# Patient Record
Sex: FEMALE | Race: AMERICAN INDIAN OR ALASKA NATIVE
[De-identification: names, ages, dates, MRNs, and addresses within clinical notes are randomized per-mention and may not be internally consistent; named-entity substitution may affect disease eponyms.]

---

## 2017-07-22 ENCOUNTER — HOSPITAL ENCOUNTER (OUTPATIENT)
Dept: HOSPITAL 31 - C.ER | Age: 67
Setting detail: OBSERVATION
LOS: 2 days | Discharge: HOME | End: 2017-07-24
Attending: INTERNAL MEDICINE | Admitting: INTERNAL MEDICINE
Payer: MEDICARE

## 2017-07-22 VITALS — BODY MASS INDEX: 24.7 KG/M2

## 2017-07-22 DIAGNOSIS — R55: Primary | ICD-10-CM

## 2017-07-22 DIAGNOSIS — I16.0: ICD-10-CM

## 2017-07-22 DIAGNOSIS — Z87.891: ICD-10-CM

## 2017-07-22 DIAGNOSIS — I10: ICD-10-CM

## 2017-07-22 LAB
ALBUMIN SERPL-MCNC: 4.3 G/DL (ref 3.5–5)
ALBUMIN/GLOB SERPL: 1.1 {RATIO} (ref 1–2.1)
ALT SERPL-CCNC: 33 U/L (ref 9–52)
APTT BLD: 29 SECONDS (ref 21–34)
AST SERPL-CCNC: 28 U/L (ref 14–36)
BACTERIA #/AREA URNS HPF: (no result) /[HPF]
BASOPHILS # BLD AUTO: 0 K/UL (ref 0–0.2)
BASOPHILS NFR BLD: 0.8 % (ref 0–2)
BILIRUB UR-MCNC: NEGATIVE MG/DL
BUN SERPL-MCNC: 15 MG/DL (ref 7–17)
CALCIUM SERPL-MCNC: 9.1 MG/DL (ref 8.6–10.4)
EOSINOPHIL # BLD AUTO: 0.1 K/UL (ref 0–0.7)
EOSINOPHIL NFR BLD: 1.3 % (ref 0–4)
ERYTHROCYTE [DISTWIDTH] IN BLOOD BY AUTOMATED COUNT: 15.3 % (ref 11.5–14.5)
GFR NON-AFRICAN AMERICAN: > 60
GLUCOSE UR STRIP-MCNC: NORMAL MG/DL
HGB BLD-MCNC: 15.5 G/DL (ref 11–16)
INR PPP: 1
LEUKOCYTE ESTERASE UR-ACNC: (no result) LEU/UL
LYMPHOCYTES # BLD AUTO: 3.2 K/UL (ref 1–4.3)
LYMPHOCYTES NFR BLD AUTO: 58.4 % (ref 20–40)
MAGNESIUM SERPL-MCNC: 1.9 MG/DL (ref 1.6–2.3)
MCH RBC QN AUTO: 27.2 PG (ref 27–31)
MCHC RBC AUTO-ENTMCNC: 33.1 G/DL (ref 33–37)
MCV RBC AUTO: 82.1 FL (ref 81–99)
MONOCYTES # BLD: 0.7 K/UL (ref 0–0.8)
MONOCYTES NFR BLD: 12.6 % (ref 0–10)
NEUTROPHILS # BLD: 1.5 K/UL (ref 1.8–7)
NEUTROPHILS NFR BLD AUTO: 26.9 % (ref 50–75)
NRBC BLD AUTO-RTO: 0.1 % (ref 0–2)
PH UR STRIP: 6 [PH] (ref 5–8)
PLATELET # BLD: 250 K/UL (ref 130–400)
PMV BLD AUTO: 8.7 FL (ref 7.2–11.7)
PROT UR STRIP-MCNC: NEGATIVE MG/DL
PROTHROMBIN TIME: 11.3 SECONDS (ref 9.7–12.2)
RBC # BLD AUTO: 5.68 MIL/UL (ref 3.8–5.2)
RBC # UR STRIP: NEGATIVE /UL
SP GR UR STRIP: 1.01 (ref 1–1.03)
SQUAMOUS EPITHIAL: 3 /HPF (ref 0–5)
URINE NITRATE: NEGATIVE
UROBILINOGEN UR-MCNC: NORMAL MG/DL (ref 0.2–1)
WBC # BLD AUTO: 5.5 K/UL (ref 4.8–10.8)

## 2017-07-22 PROCEDURE — 81001 URINALYSIS AUTO W/SCOPE: CPT

## 2017-07-22 PROCEDURE — 99285 EMERGENCY DEPT VISIT HI MDM: CPT

## 2017-07-22 PROCEDURE — 83735 ASSAY OF MAGNESIUM: CPT

## 2017-07-22 PROCEDURE — 80053 COMPREHEN METABOLIC PANEL: CPT

## 2017-07-22 PROCEDURE — 97161 PT EVAL LOW COMPLEX 20 MIN: CPT

## 2017-07-22 PROCEDURE — 82948 REAGENT STRIP/BLOOD GLUCOSE: CPT

## 2017-07-22 PROCEDURE — 36415 COLL VENOUS BLD VENIPUNCTURE: CPT

## 2017-07-22 PROCEDURE — 85025 COMPLETE CBC W/AUTO DIFF WBC: CPT

## 2017-07-22 PROCEDURE — 97116 GAIT TRAINING THERAPY: CPT

## 2017-07-22 PROCEDURE — 85610 PROTHROMBIN TIME: CPT

## 2017-07-22 PROCEDURE — 85730 THROMBOPLASTIN TIME PARTIAL: CPT

## 2017-07-22 PROCEDURE — 84484 ASSAY OF TROPONIN QUANT: CPT

## 2017-07-22 PROCEDURE — 70450 CT HEAD/BRAIN W/O DYE: CPT

## 2017-07-22 NOTE — C.PDOC
History Of Present Illness


Pt had a near-syncopal episode associated with yesica-oral numbness. 


Time Seen by Provider: 17 18:21


Chief Complaint (Nursing): Dizziness/Lightheaded


History Per: Patient


Onset/Duration Of Symptoms: Hrs (tonight)


Current Symptoms Are (Timing): Better


Number Of Syncopal Episodes: 1


Associated Symptoms Preceding Syncopal Episode: Lightheadedness


Fall Associated With With Symptoms: No


Severity: Moderate


Additional History Per: Prior Records





- Symptoms Of CVA


Associated Symptoms: denies: Impaired Speech, Seizure Activity, New Vision 

Deficit(Left), New Vision Deficit(Right), New Confusion


Recent Head Trauma: No





Past Medical History


Reviewed: Historical Data, Nursing Documentation, Vital Signs


Vital Signs: 





 Last Vital Signs











Temp  98.2 F   17 18:12


 


Pulse  77   17 18:53


 


Resp  16   17 18:53


 


BP  133/78   17 18:53


 


Pulse Ox  98   17 18:53














- Medical History


PMH: HTN, Hypercholesterolemia





- [facility] Procedures











INSPECTION OF LEFT EAR, ENDO (16)








Family History: States: Hypertension





- Social History


Hx Tobacco Use: No


Hx Alcohol Use: No


Hx Substance Use: No





- Immunization History


Hx Tetanus Toxoid Vaccination: No


Hx Pneumococcal Vaccination: No





Review Of Systems


Except As Marked, All Systems Reviewed And Found Negative.


Constitutional: Positive for: Malaise.  Negative for: Fever


Cardiovascular: Positive for: Light Headedness.  Negative for: Chest Pain


Respiratory: Negative for: Shortness of Breath, Hemoptysis


Gastrointestinal: Negative for: Vomiting, Abdominal Pain, Diarrhea


Genitourinary: Negative for: Dysuria


Musculoskeletal: Negative for: Neck Pain, Back Pain


Skin: Negative for: Rash


Neurological: Positive for: Numbness (yesica-oral).  Negative for: Weakness, 

Incoordination, Change in Speech, Confusion, Seizures, Altered Mental Status, 

Headache





Physical Exam





- Physical Exam


Appears: Non-toxic, No Acute Distress


Skin: Normal Color, Warm, Dry, No Rash


Head: Atraumatic, Normacephalic


Eye(s): bilateral: Normal Inspection, PERRL, EOMI


Oral Mucosa: Moist, No Drooling, No Trismus


Neck: Normal ROM, Supple


Cardiovascular: Rhythm Regular


Respiratory: Normal Breath Sounds, No Accessory Muscle Use


Gastrointestinal/Abdominal: Soft, No Tenderness


Back: No CVA Tenderness


Extremity: Normal ROM


Neurological/Psych: Oriented x3, Normal Speech, Normal Cognition, Normal 

Cranial Nerves, No Cerebellar Signs, Normal Motor, Normal Sensation





ED Course And Treatment





- Laboratory Results


Result Diagrams: 


 17 18:57





 17 18:57


Lab Interpretation: No Acute Changes


ECG: Interpreted By Me, Viewed By Me


ECG Rhythm: Sinus Rhythm, Nonspecific Changes


Rate From EC


O2 Sat by Pulse Oximetry: 98


Pulse Ox Interpretation: Normal





- CT Scan/US


  ** CT head


Other Rad Studies (CT/US): Read By Radiologist, Radiology Report Reviewed


CT/US Interpretation: IMPRESSION:  1.  No definite acute intracranial 

abnormality.  Acute infarction may be CT.  occult within first 24 hours.  If a 

focal deficit persists, consider followup.  CT or MRI for further evaluation.  

2.  Incidental/non-acute findings are described above.





- Physician Consult Information


Physician Contacted: Dario Cisneros (PMD)


Outcome Of Conversation: He wants pt placed under Dr. DARIO Martin's service.





Progress





- Interventions


Interventions:: Observation, Intravenous fluid





- Data Reviewed


Data Reviewed: Lab, Diagnostic imaging, EKG, Old records





- Patient Status


Patient status: Mostly improved





- Continuity of Care


Discussed patient case with:: Patient, Family-HIPPA compliant, ED Nurse, PMD, 

Covering for PMD





Disposition


Discussed With : Jaylen Martin


Comment: He accepted pt on his service.


Doctor Will See Patient In The: Hospital


Counseled Patient/Family Regarding: Studies Performed, Diagnosis





- Disposition


Disposition: HOSPITALIZED


Disposition Time: 22:25


Condition: STABLE





- Clinical Impression


Clinical Impression: 


 Near syncope, Perioral numbness

## 2017-07-22 NOTE — CT
EXAM:

  CT Head Without Intravenous Contrast



CLINICAL HISTORY:

  66 years old, female; Signs and symptoms; Syncope and collapse; Additional 

info: Near syncope, yesica-oral numbness



TECHNIQUE:

  Axial computed tomography images of the head/brain without intravenous 

contrast.  This CT exam was performed using one or more of the following dose 

reduction techniques:  automated exposure control, adjustment of the mA and/or 

kV according to patient size, and/or use of iterative reconstruction technique.



COMPARISON:

  No relevant prior studies available.



FINDINGS:

  Brain:  Mild atrophy.  Minimal encephalomalacia within LEFT frontal region.  

No intracranial hemorrhage.  No mass.  No definite edema.

  Ventricles:  No hydrocephalus.

  Bones/joints:  No acute fracture.

  Soft tissues:  Unremarkable.

  Sinuses:  Scattered minimal mucosal thickening of ethmoid sinuses.

  Mastoid air cells:  No mastoid effusion.

  Orbits:  Unremarkable as visualized.



IMPRESSION:     

1.  No definite acute intracranial abnormality.  Acute infarction may be CT 

occult within first 24 hours.  If a focal deficit persists, consider followup 

CT or MRI for further evaluation.

2.  Incidental/non-acute findings are described above.

## 2017-07-22 NOTE — CP.PCM.HP
Past Patient History





- Tetanus Immunizations


Tetanus Immunization: Unknown





- Past Medical History & Family History


Past Medical History?: Yes





- Past Social History


Smoking Status: Heavy Smoker > 10 Cigarettes Daily





- CARDIAC


Hx Hypercholesterolemia: Yes


Hx Hypertension: Yes





- NEUROLOGICAL


HX Cerebrovascular Accident: Yes (99, 02)





- MUSCULOSKELETAL/RHEUMATOLOGICAL


Hx Falls: No





- PSYCHIATRIC


Hx Substance Use: No





- ANESTHESIA


Hx Anesthesia: Yes


Hx Anesthesia Reactions: No


Hx Malignant Hyperthermia: No





Meds


Allergies/Adverse Reactions: 


 Allergies











Allergy/AdvReac Type Severity Reaction Status Date / Time


 


No Known Allergies Allergy   Verified 07/22/17 18:18














Results





- Vital Signs


Recent Vital Signs: 





 Last Vital Signs











Temp  98.2 F   07/22/17 18:12


 


Pulse  80   07/22/17 22:56


 


Resp  14   07/22/17 22:56


 


BP  124/71   07/22/17 22:56


 


Pulse Ox  98   07/22/17 22:56














- Labs


Result Diagrams: 


 07/22/17 18:57





 07/22/17 18:57

## 2017-07-23 LAB
CK MB SERPL-MCNC: 0.92 NG/ML (ref 0–3.38)
CK MB SERPL-MCNC: 1.01 NG/ML (ref 0–3.38)

## 2017-07-23 RX ADMIN — ENOXAPARIN SODIUM SCH MG: 40 INJECTION SUBCUTANEOUS at 14:45

## 2017-07-23 NOTE — CP.PCM.PN
Subjective





- Date & Time of Evaluation


Date of Evaluation: 07/23/17


Time of Evaluation: 12:40





- Subjective


Subjective: 








clinically same





Objective





- Vital Signs/Intake and Output


Vital Signs (last 24 hours): 


 











Temp Pulse Resp BP Pulse Ox


 


 98.2 F   80   20   120/80   98 


 


 07/22/17 18:12  07/23/17 09:35  07/22/17 23:55  07/23/17 09:35  07/22/17 22:56











- Medications


Medications: 


 Current Medications





Aspirin (Ecotrin)  81 mg PO DAILY [facility]


   Last Admin: 07/23/17 09:32 Dose:  81 mg


Losartan Potassium (Cozaar)  50 mg PO DAILY [facility]


   Last Admin: 07/23/17 09:34 Dose:  50 mg


Pneumococcal Polyvalent Vaccine (Pneumovax 23 Vaccine)  0.5 ml IM .ONCE ONE


   Stop: 07/25/17 14:01


Rosuvastatin Calcium (Crestor)  10 mg PO HS [facility]











- Labs


Labs: 


 











PT  11.3 SECONDS (9.7-12.2)   07/22/17  18:57    


 


INR  1.0   07/22/17  18:57    


 


APTT  29 SECONDS (21-34)   07/22/17  18:57    














- Constitutional


Appears: Well





- Head Exam


Head Exam: ATRAUMATIC, NORMAL INSPECTION, NORMOCEPHALIC





- Eye Exam


Eye Exam: EOMI, Normal appearance, PERRL


Pupil Exam: NORMAL ACCOMODATION, PERRL





- ENT Exam


ENT Exam: Mucous Membranes Moist, Normal Exam





- Neck Exam


Neck Exam: Full ROM, Normal Inspection.  absent: Lymphadenopathy





- Respiratory Exam


Respiratory Exam: Decreased Breath Sounds





- Cardiovascular Exam


Cardiovascular Exam: REGULAR RHYTHM, +S1, +S2.  absent: Murmur





- GI/Abdominal Exam


GI & Abdominal Exam: Soft, Diminished Bowel Sounds





- Rectal Exam


Rectal Exam: Deferred

## 2017-07-23 NOTE — CON
DATE:  07/23/2017



CHIEF COMPLAINT:  Near syncope.



HISTORY OF PRESENT ILLNESS:  This is a 66-year-old woman with history of

hypertension, uncontrolled hypercholesterolemia, history of hypertensive

urgency in the past.  She became lightheaded with perioral numbness, rather

spinning sensation of the room, but no focal weakness or paresthesias of

the extremities.  She had elevated systolic blood pressure over 165 which

is high and slightly elevated diastolic pressure.  Currently, her blood

pressure is much better.  She is doing much better at this time.  No focal

neurological deficits.  On examination, CAT scan of the head showed no

acute intracranial abnormality.



PAST MEDICAL HISTORY:  Hypertension, hypercholesterolemia.



ALLERGIES:  No known drug allergies.



FAMILY HISTORY:  Noncontributory.



REVIEW OF SYSTEMS:  A 14-point review of systems negative except per HPI.



MEDICATIONS:  Reviewed by nurse reconciliation sheet.



PHYSICAL EXAMINATION

VITAL SIGNS:  Temperature 98, pulse rate of 80, blood pressure 120/80,

respiratory rate *------*.

GENERAL:  The patient is sitting up in bed, in no acute distress.

HEENT:  Atraumatic, normocephalic.  PERRLA.  Extraocular muscles intact.

NECK:  Supple.  No JVD.  No adenopathy noted.

LUNGS:  Clear to auscultation.  No adventitious sounds.

HEART:  S1 and S2.  Normal rate and rhythm.  No murmur, rubs, or gallops.

ABDOMEN:  Soft, nontender, nondistended.  Bowel sounds are present.

EXTREMITIES:  No clubbing.  No cyanosis.  Peripheral pulses 2+ felt

bilaterally.

NEUROLOGIC:  The patient is alert, oriented to person, place, month and

year.  Speech is fluent without any errors.  Cranial nerves II through XII

intact.  Motor exam:  Moves all extremities equally.  Toes downgoing

bilaterally.  Sensory exam:  Light touch, proprioception, vibration intact.

DTRs are 2+.  Coordination, finger-to-nose intact.  Gait deferred for now.



LABORATORY DATA:  Sodium is 141, potassium 3.9, chloride 100, carbon

dioxide 26, BUN of 15, creatinine 0.7, random glucose 111.



ASSESSMENT:

1.  A 66-year-old woman history of hypertension, hypercholesterolemia, came

in for lightheadedness with perioral numbness.  Her symptoms are likely

secondary to mild transient hypertensive urgency.  At this time, she is

clinically stable.  Neuro exam is nonfocal.  CAT scan of the head showed no

acute intracranial abnormalities.  At this time, recommend low-sodium,

low-fat diet.

2.  Keep the systolic blood pressure between 130 to 140.

3.  Aspirin 81 and atorvastatin 20 for stroke prevention and continue

present medical management.  She is cleared from a neurological

perspective.







__________________________________________

Fransisco Nicholson MD



DD:  07/23/2017 14:28:51

DT:  07/23/2017 16:25:24

Job # [id number]

## 2017-07-24 VITALS
TEMPERATURE: 97.6 F | HEART RATE: 73 BPM | DIASTOLIC BLOOD PRESSURE: 72 MMHG | RESPIRATION RATE: 18 BRPM | SYSTOLIC BLOOD PRESSURE: 114 MMHG | OXYGEN SATURATION: 96 %

## 2017-07-24 RX ADMIN — ENOXAPARIN SODIUM SCH MG: 40 INJECTION SUBCUTANEOUS at 10:37

## 2017-07-24 NOTE — CP.PCM.CON
History of Present Illness





- History of Present Illness


History of Present Illness: 





66-year-old lady with history of hypertension and mixed hyperlipidemia, 

reportedly with prior cerebrovascular accident twice however she has no 

residual deficit. She also had history of thyroid malignancy post surgery and 

radiation. She was admitted through the emergency department with feeling weak 

and dizzy and addition to numbness around her mouth. There was no loss of 

consciousness and no reported hypotension or arrhythmia in the emergency 

department or on EKG. The CAT scan of the head was negative for cerebrovascular 

accident or bleed. Recent echocardiogram at [facility] few months back was 

with normal left ventricular contractility no congenital abnormalities, no 

aortic stenosis and no pericardial disease. At this time she is Rather 

comfortable after observation with stable blood pressure after presentation 

with elevated blood pressure, labs had no anemia no evidence of bleed, and 

troponin I was flat for myocardial injury. Unlikely arrhythmia or cardiac 

etiology for the dizziness and near syncope. Further stress test can be done as 

outpatient.





Review of Systems





- Constitutional


Constitutional: Anorexia, Weakness





- EENT


Eyes: absent: Discharge


Ears: absent: Ear Discharge, Dizziness


Nose/Mouth/Throat: absent: Epistaxis





- Cardiovascular


Cardiovascular: absent: Acrocyanosis, Chest Pain, Diaphoresis, Palpitations, 

Pedal Edema, Syncope





- Respiratory


Respiratory: absent: Cough, Dyspnea, Hemoptysis





- Gastrointestinal


Gastrointestinal: absent: Diarrhea, Hematochezia, Vomiting





- Genitourinary


Genitourinary: absent: Hematuria





- Reproductive: Female


Reproductive:Female: Post Menopausal





Past Patient History





- Tetanus Immunizations


Tetanus Immunization: Unknown





- Past Medical History & Family History


Past Medical History?: Yes





- Past Social History


Smoking Status: Former Smoker





- CARDIAC


Hx Cardiac Disorders: Yes


Hx Hypercholesterolemia: Yes


Hx Hypertension: Yes





- PULMONARY


Hx Respiratory Disorders: No





- NEUROLOGICAL


Hx Neurological Disorder: Yes


HX Cerebrovascular Accident: Yes (99, 02)





- HEENT


Hx HEENT Problems: No





- RENAL


Hx Chronic Kidney Disease: No





- ENDOCRINE/METABOLIC


Hx Endocrine Disorders: No





- HEMATOLOGICAL/ONCOLOGICAL


Hx Blood Disorders: No





- INTEGUMENTARY


Hx Dermatological Problems: No





- MUSCULOSKELETAL/RHEUMATOLOGICAL


Hx Musculoskeletal Disorders: No


Hx Falls: No





- GASTROINTESTINAL


Hx Gastrointestinal Disorders: No





- GENITOURINARY/GYNECOLOGICAL


Hx Genitourinary Disorders: No


Hx Uterine Cancer: Yes (Had radiation,in remission)





- PSYCHIATRIC


Hx Psychophysiologic Disorder: No


Hx Substance Use: No





- SURGICAL HISTORY


Hx Surgeries: Yes


Hx Hysterectomy: Yes (Partial 1999)





- ANESTHESIA


Hx Anesthesia: Yes


Hx Anesthesia Reactions: No


Hx Malignant Hyperthermia: No


Has any member of the family had a problem w/ anesthesia?: No





Meds


Home Medications: 


 Home Medication List











 Medication  Instructions  Recorded  Confirmed  Type


 


Losartan [Cozaar] 50 mg PO DAILY  tab 07/24/17  Rx


 


Losartan [Cozaar] 50 mg PO DAILY #30 tab 07/24/17  Rx











Allergies/Adverse Reactions: 


 Allergies











Allergy/AdvReac Type Severity Reaction Status Date / Time


 


No Known Allergies Allergy   Verified 07/22/17 18:18














- Medications


Medications: 


 Current Medications





Aspirin (Ecotrin)  81 mg PO DAILY [facility]


   Last Admin: 07/24/17 10:37 Dose:  81 mg


Enoxaparin Sodium (Lovenox)  40 mg SC DAILY [facility]


   Last Admin: 07/24/17 10:37 Dose:  40 mg


Losartan Potassium (Cozaar)  50 mg PO DAILY [facility]


   Last Admin: 07/24/17 10:37 Dose:  50 mg


Pneumococcal Polyvalent Vaccine (Pneumovax 23 Vaccine)  0.5 ml IM .ONCE ONE


   Stop: 07/25/17 14:01


Rosuvastatin Calcium (Crestor)  10 mg PO [facility]


   Last Admin: 07/23/17 21:38 Dose:  10 mg











Physical Exam





- Constitutional


Appears: Non-toxic





- Head Exam


Head Exam: ATRAUMATIC





- Eye Exam


Eye Exam: EOMI





- ENT Exam


ENT Exam: Mucous Membranes Moist





- Neck Exam


Neck exam: Negative for: Lymphadenopathy, Thyromegaly





- Respiratory Exam


Respiratory Exam: Clear to Auscultation Bilateral.  absent: Chest Wall 

Tenderness, Rales





- Cardiovascular Exam


Cardiovascular Exam: REGULAR RHYTHM, Systolic Murmur





- GI/Abdominal Exam


GI & Abdominal Exam: Normal Bowel Sounds.  absent: Organomegaly





- Rectal Exam


Rectal Exam: Deferred





- Extremities Exam


Extremities exam: Positive for: normal capillary refill.  Negative for: calf 

tenderness





- Neurological Exam


Neurological exam: Alert, Oriented x3





- Psychiatric Exam


Psychiatric exam: Normal Mood





- Skin


Skin Exam: Dry





Results





- Vital Signs


Recent Vital Signs: 


 Last Vital Signs











Temp  97.6 F   07/24/17 07:25


 


Pulse  73   07/24/17 07:25


 


Resp  18   07/24/17 07:25


 


BP  114/72   07/24/17 07:25


 


Pulse Ox  96   07/24/17 07:25














- Labs


Result Diagrams: 


 07/22/17 18:57





 07/22/17 18:57


Labs: 


 Laboratory Results - last 24 hr











  07/23/17





  13:54


 


Total Creatine Kinase  146 H


 


CK-MB (Mass)  0.92


 


Troponin I, Quant  < 0.0120














Assessment & Plan


(1) Near syncope


Status: Acute   


Comment: Unlikely cardiac arrhythmia, normal left ventricular contractility on 

echo, no aortic stenosis.   





(2) History of CVA (cerebrovascular accident)


Status: Chronic   





(3) Hyperlipemia


Status: Chronic   





(4) History of hypertension


Status: Chronic   Priority: Low   





(5) History of thyroid cancer


Status: Chronic   Priority: Low

## 2017-07-24 NOTE — CP.PCM.PN
Subjective





- Date & Time of Evaluation


Date of Evaluation: 07/24/17


Time of Evaluation: 14:26





- Subjective


Subjective: 





65 Y/O FEMALE SEEN AND EXAMINED BY DR DARIO BANUELOS, PT DENIES ANY CHEST PAIN, 

SHORTNESS OF BREATH, PALPITATIONS, RESP EASY AND UNLABORED. NAD





Objective





- Vital Signs/Intake and Output


Vital Signs (last 24 hours): 


 











Temp Pulse Resp BP Pulse Ox


 


 97.6 F   73   18   114/72   96 


 


 07/24/17 07:25  07/24/17 07:25  07/24/17 07:25  07/24/17 07:25  07/24/17 07:25











- Medications


Medications: 


 Current Medications





Aspirin (Ecotrin)  81 mg PO DAILY [facility]


   Last Admin: 07/24/17 10:37 Dose:  81 mg


Enoxaparin Sodium (Lovenox)  40 mg SC DAILY [facility]


   Last Admin: 07/24/17 10:37 Dose:  40 mg


Losartan Potassium (Cozaar)  50 mg PO DAILY [facility]


   Last Admin: 07/24/17 10:37 Dose:  50 mg


Pneumococcal Polyvalent Vaccine (Pneumovax 23 Vaccine)  0.5 ml IM .ONCE ONE


   Stop: 07/25/17 14:01


Rosuvastatin Calcium (Crestor)  10 mg PO HS [facility]


   Last Admin: 07/23/17 21:38 Dose:  10 mg











- Labs


Labs: 


 











PT  11.3 SECONDS (9.7-12.2)   07/22/17  18:57    


 


INR  1.0   07/22/17  18:57    


 


APTT  29 SECONDS (21-34)   07/22/17  18:57    














Assessment and Plan





- Assessment and Plan (Free Text)


Assessment: 





65 Y/O FEMALE WITH PMHX HTN, HYPERCHOLESTEROLEMIA, ADMITTED FOR NEAR SYNCOPE, 

PERIORAL NUMBNESS. CT HEAD- NO ACUTE INTRACRANIAL ABNORMALITIES, ECHO- NORMAL 

EF. PT CLEARED FOR D/C AS PER DR RAMOS AND DR QUINTANA, PT EDUCATED TO CONTINUE 

HOME MEDS, F/U WITH DR BANUELOS, DR QUINTANA, DR RAMOS IN THE OFFICE, OUTPT STRESS 

TEST, RETURN TO ED OF ANY WORSENING SYMPTOMS AGREE W/POC, VERBALIZE 

UNDERSTANDING.

## 2017-07-25 NOTE — CARD
--------------- APPROVED REPORT --------------





EKG Measurement

Heart [id number]

[place] [id number]

[id number]

[id number]

[id number]



<Conclusion>

Normal sinus rhythm

Poor R wave progression.

May be due to electrodes placement.

## 2018-01-10 ENCOUNTER — HOSPITAL ENCOUNTER (EMERGENCY)
Dept: HOSPITAL 31 - C.ER | Age: 68
Discharge: HOME | End: 2018-01-10
Payer: MEDICARE

## 2018-01-10 VITALS — TEMPERATURE: 98.2 F | SYSTOLIC BLOOD PRESSURE: 132 MMHG | DIASTOLIC BLOOD PRESSURE: 80 MMHG | HEART RATE: 84 BPM

## 2018-01-10 VITALS — OXYGEN SATURATION: 100 % | RESPIRATION RATE: 18 BRPM

## 2018-01-10 VITALS — BODY MASS INDEX: 24.7 KG/M2

## 2018-01-10 DIAGNOSIS — R07.89: Primary | ICD-10-CM

## 2018-01-10 DIAGNOSIS — E78.00: ICD-10-CM

## 2018-01-10 DIAGNOSIS — N39.0: ICD-10-CM

## 2018-01-10 DIAGNOSIS — R05: ICD-10-CM

## 2018-01-10 DIAGNOSIS — F17.210: ICD-10-CM

## 2018-01-10 DIAGNOSIS — I10: ICD-10-CM

## 2018-01-10 LAB
ALBUMIN SERPL-MCNC: 4.1 G/DL (ref 3.5–5)
ALBUMIN/GLOB SERPL: 1 {RATIO} (ref 1–2.1)
ALT SERPL-CCNC: 23 U/L (ref 9–52)
AST SERPL-CCNC: 33 U/L (ref 14–36)
BACTERIA #/AREA URNS HPF: (no result) /[HPF]
BASOPHILS # BLD AUTO: 0.1 K/UL (ref 0–0.2)
BASOPHILS NFR BLD: 0.9 % (ref 0–2)
BILIRUB UR-MCNC: NEGATIVE MG/DL
BUN SERPL-MCNC: 12 MG/DL (ref 7–17)
CALCIUM SERPL-MCNC: 8.3 MG/DL (ref 8.6–10.4)
EOSINOPHIL # BLD AUTO: 0 K/UL (ref 0–0.7)
EOSINOPHIL NFR BLD: 0.8 % (ref 0–4)
ERYTHROCYTE [DISTWIDTH] IN BLOOD BY AUTOMATED COUNT: 15.2 % (ref 11.5–14.5)
GFR NON-AFRICAN AMERICAN: > 60
GLUCOSE UR STRIP-MCNC: NORMAL MG/DL
HGB BLD-MCNC: 15.7 G/DL (ref 11–16)
LEUKOCYTE ESTERASE UR-ACNC: (no result) LEU/UL
LYMPHOCYTES # BLD AUTO: 2.7 K/UL (ref 1–4.3)
LYMPHOCYTES NFR BLD AUTO: 45.2 % (ref 20–40)
MCH RBC QN AUTO: 26.6 PG (ref 27–31)
MCHC RBC AUTO-ENTMCNC: 32.6 G/DL (ref 33–37)
MCV RBC AUTO: 81.6 FL (ref 81–99)
MONOCYTES # BLD: 0.6 K/UL (ref 0–0.8)
MONOCYTES NFR BLD: 9.6 % (ref 0–10)
NEUTROPHILS # BLD: 2.6 K/UL (ref 1.8–7)
NEUTROPHILS NFR BLD AUTO: 43.5 % (ref 50–75)
NRBC BLD AUTO-RTO: 0.2 % (ref 0–2)
PH UR STRIP: 7 [PH] (ref 5–8)
PLATELET # BLD: 248 K/UL (ref 130–400)
PMV BLD AUTO: 7.9 FL (ref 7.2–11.7)
PROT UR STRIP-MCNC: NEGATIVE MG/DL
RBC # BLD AUTO: 5.91 MIL/UL (ref 3.8–5.2)
RBC # UR STRIP: NEGATIVE /UL
SP GR UR STRIP: 1.01 (ref 1–1.03)
SQUAMOUS EPITHIAL: 1 /HPF (ref 0–5)
URINE NITRATE: POSITIVE
UROBILINOGEN UR-MCNC: NORMAL MG/DL (ref 0.2–1)
WBC # BLD AUTO: 5.9 K/UL (ref 4.8–10.8)

## 2018-01-10 NOTE — RAD
HISTORY:

SOB  



COMPARISON:

Comparison chest dated 08/27/2016. PacsNo prior.



TECHNIQUE:

Chest PA and lateral



FINDINGS:



LUNGS:

The interstitial markings are increased and coarsened.  Rule out 

sequela of reactive/ inflammatory airway disease or viral illness. No 

focal consolidation. Minor biapical pleural thickening. 



PLEURA:

As above. No significant effusion.  No evidence of pneumothorax.



CARDIOVASCULAR:

Heart size is upper limits of normal.



OSSEOUS STRUCTURES:

No significant abnormalities.



VISUALIZED UPPER ABDOMEN:

Normal.



OTHER FINDINGS:

None.



IMPRESSION:

The interstitial markings are increased and coarsened.  Rule out 

sequela of reactive/ inflammatory airway disease or viral illness. No 

focal consolidation.

## 2018-01-10 NOTE — C.PDOC
History Of Present Illness


68 y/o female with PMHx of HTN presents to ED with complaints of left sided 

"sharp" chest pain since this morning and productive of light sputum cough for 

3 days. Patient reports the pain radiates to her back and states she took 

Tylenol and Aleve with improvement but symptoms recurred back which prompted 

visit to ED. Patient denies fever, chills, sob, trauma or any other complaints 

at this time. 


Time Seen by Provider: 01/10/18 16:26


Chief Complaint (Nursing): Chest Pain


History Per: Patient


History/Exam Limitations: no limitations


Onset/Duration Of Symptoms: Days


Current Symptoms Are (Timing): Still Present





Past Medical History


Reviewed: Historical Data, Nursing Documentation, Vital Signs


Vital Signs: 


 Last Vital Signs











Temp  98.2 F   01/10/18 18:05


 


Pulse  84   01/10/18 18:05


 


Resp  18   01/10/18 18:05


 


BP  132/80   01/10/18 18:05


 


Pulse Ox  100   01/10/18 18:05














- Medical History


PMH: HTN, Hypercholesterolemia


Surgical History: No Surg Hx





- [facility] Procedures








INSPECTION OF LEFT EAR, ENDO (08/29/16)








Family History: States: Hypertension





- Social History


Hx Tobacco Use: No


Hx Alcohol Use: No


Hx Substance Use: No





- Immunization History


Hx Tetanus Toxoid Vaccination: No


Hx Influenza Vaccination: No


Hx Pneumococcal Vaccination: No





Review Of Systems


Constitutional: Negative for: Fever, Chills


Cardiovascular: Positive for: Chest Pain


Respiratory: Positive for: Cough, Sputum.  Negative for: Shortness of Breath


Gastrointestinal: Negative for: Nausea, Vomiting


Skin: Negative for: Rash


Neurological: Negative for: Weakness, Numbness





Physical Exam





- Physical Exam


Appears: Non-toxic, No Acute Distress


Skin: Normal Color, Warm, Dry, No Rash


Head: Atraumatic, Normacephalic


Oral Mucosa: Moist


Neck: Normal ROM, Supple


Chest: Other (Reproducible chest pain to palpation)


Cardiovascular: Rhythm Regular


Respiratory: No Rales, No Rhonchi, Wheezing (Left sided)


Gastrointestinal/Abdominal: Soft, No Tenderness, No Guarding, No Rebound


Extremity: Normal ROM, No Pedal Edema, Capillary Refill (<2 seconds)


Neurological/Psych: Oriented x3





ED Course And Treatment





- Laboratory Results


Result Diagrams: 


 01/10/18 16:39





 01/10/18 16:39


Lab Interpretation: Normal


ECG: Interpreted By Me


ECG Rhythm: Sinus Rhythm (with Q waves in III, AVF)


ECG Interpretation: No Acute Changes


O2 Sat by Pulse Oximetry: 100 (RA)


Pulse Ox Interpretation: Normal





- Radiology


CXR: Viewed By Me, Read By Radiologist


CXR Interpretation: Yes: Other (Increased interstitial infiltrates)


Reevaluation Time: 19:10


Reassessment Condition: Improved (No discomfort at this time.)





Disposition


Counseled Patient/Family Regarding: Studies Performed, Diagnosis, Need For 

Followup, Rx Given





- Disposition


Referrals: 


Dario Cisneros MD [Staff Provider] - 


Disposition: HOME/ ROUTINE


Disposition Time: 19:11


Condition: STABLE


Prescriptions: 


Acetaminophen with Codeine [Tylenol with Codeine #3 Tablet] 1 each PO Q4 PRN #

14 tablet


 PRN Reason: Pain, Severe (8-10)


Amoxicillin/Clavulanate [Augmentin 875 MG-125 MG] 1 tab PO BID #14 tab


Instructions:  Chest Wall Pain (ED), Acute Cough (ED), Urinary Tract Infection 

in Women (ED)


Forms:  [facility] (English)





- Clinical Impression


Clinical Impression: 


 Chest wall pain, Cough, UTI (urinary tract infection)








- Scribe Statement


The provider has reviewed the documentation as recorded by the Scribcristy Chi





All medical record entries made by the Scribe were at my direction and 

personally dictated by me. I have reviewed the chart and agree that the record 

accurately reflects my personal performance of the history, physical exam, 

medical decision making, and the department course for this patient. I have 

also personally directed, reviewed, and agree with the discharge instructions 

and disposition.

## 2018-01-11 NOTE — CARD
--------------- APPROVED REPORT --------------





EKG Measurement

Heart [id number]

[place] [id number]

[id number]

[id number]

[id number]



<Conclusion>

Normal sinus rhythm

Inferior infarct, age undetermined

Abnormal ECG

## 2019-02-26 ENCOUNTER — HOSPITAL ENCOUNTER (EMERGENCY)
Dept: HOSPITAL 31 - C.ER | Age: 69
Discharge: HOME | End: 2019-02-26
Payer: MEDICARE

## 2019-02-26 VITALS
OXYGEN SATURATION: 97 % | RESPIRATION RATE: 16 BRPM | DIASTOLIC BLOOD PRESSURE: 85 MMHG | TEMPERATURE: 98.4 F | SYSTOLIC BLOOD PRESSURE: 130 MMHG | HEART RATE: 83 BPM

## 2019-02-26 VITALS — BODY MASS INDEX: 27.4 KG/M2

## 2019-02-26 DIAGNOSIS — M54.5: Primary | ICD-10-CM

## 2019-02-26 NOTE — C.PDOC
History Of Present Illness


Pt c/o left lower back pain. Denies injury. 


Time Seen by Provider: 02/26/19 11:42


Chief Complaint (Nursing): Back Pain


History Per: Patient


Onset/Duration Of Symptoms: Days (1)


Current Symptoms Are (Timing): Still Present


Quality Of Discomfort: "Pain"


Severity: Moderate


Associated Symptoms: None


Exacerbating Factor(s): Turning, Movement


Additional History Per: Prior Records





Past Medical History


Reviewed: Historical Data, Nursing Documentation, Vital Signs


Vital Signs: 





                                Last Vital Signs











Temp  98.4 F   02/26/19 11:34


 


Pulse  83   02/26/19 11:34


 


Resp  16   02/26/19 11:34


 


BP  130/85   02/26/19 11:34


 


Pulse Ox  97   02/26/19 11:34














- Medical History


PMH: HTN, Hypercholesterolemia





- [facility] Procedures











INSPECTION OF LEFT EAR, ENDO (08/29/16)








Family History: States: Hypertension





- Social History


Hx Tobacco Use: No


Hx Alcohol Use: No


Hx Substance Use: No





- Immunization History


Hx Tetanus Toxoid Vaccination: No


Hx Influenza Vaccination: No


Hx Pneumococcal Vaccination: No





Review Of Systems


Except As Marked, All Systems Reviewed And Found Negative.


Constitutional: Negative for: Fever, Weakness


Cardiovascular: Negative for: Chest Pain


Respiratory: Negative for: Shortness of Breath


Gastrointestinal: Negative for: Abdominal Pain, Diarrhea


Genitourinary: Negative for: Dysuria, Incontinence, Hematuria


Musculoskeletal: Negative for: Neck Pain


Skin: Negative for: Rash


Neurological: Negative for: Weakness, Numbness





Physical Exam





- Physical Exam


Appears: Non-toxic, No Acute Distress


Skin: Normal Color, Warm, Dry, No Rash


Head: Atraumatic, Normacephalic


Eye(s): bilateral: PERRL, EOMI


Neck: Normal ROM, Supple


Cardiovascular: Rhythm Regular


Respiratory: Normal Breath Sounds, No Accessory Muscle Use


Gastrointestinal/Abdominal: Soft, No Tenderness


Back: No CVA Tenderness, No Vertebral Tenderness, Paraspinal Tenderness (left 

lower)


Extremity: Normal ROM, No Pedal Edema, No Calf Tenderness


Neurological/Psych: Oriented x3, Normal Motor, Normal Sensation


Gait: Steady





ED Course And Treatment


O2 Sat by Pulse Oximetry: 97


Pulse Ox Interpretation: Normal


Reassessment Condition: Improved





Disposition


Counseled Patient/Family Regarding: Diagnosis, Need For Followup, Rx Given





- Disposition


Disposition: HOME/ ROUTINE


Disposition Time: 12:28


Condition: STABLE


Additional Instructions: 


Follow up with your doctor for further evaluation and treatment. Return to the 

ER if you develop weakness, numbness, abdominal pain, trouble urinating, 

worsening of symptoms or if you have any other concerns. 


Prescriptions: 


Cyclobenzaprine [Cyclobenzaprine HCl] 10 mg PO TID PRN #15 tab


 PRN Reason: Muscle Spasm


Naproxen 375 mg PO BID PRN #20 tablet


 PRN Reason: Pain, Moderate (4-7)


Instructions:  Low Back Pain  (DC)





- Clinical Impression


Clinical Impression: 


 Left low back pain